# Patient Record
Sex: MALE | Race: WHITE | Employment: FULL TIME | URBAN - METROPOLITAN AREA
[De-identification: names, ages, dates, MRNs, and addresses within clinical notes are randomized per-mention and may not be internally consistent; named-entity substitution may affect disease eponyms.]

---

## 2017-02-14 PROBLEM — M25.551 PAIN OF RIGHT HIP JOINT: Status: ACTIVE | Noted: 2017-02-14

## 2017-02-14 PROBLEM — M53.3 SI (SACROILIAC) PAIN: Status: ACTIVE | Noted: 2017-02-14

## 2021-11-29 ENCOUNTER — LAB ENCOUNTER (OUTPATIENT)
Dept: LAB | Facility: HOSPITAL | Age: 51
End: 2021-11-29
Attending: STUDENT IN AN ORGANIZED HEALTH CARE EDUCATION/TRAINING PROGRAM
Payer: COMMERCIAL

## 2021-11-29 DIAGNOSIS — Z01.818 PRE-OP TESTING: ICD-10-CM

## 2021-12-02 PROBLEM — Z12.11 SPECIAL SCREENING FOR MALIGNANT NEOPLASMS, COLON: Status: ACTIVE | Noted: 2021-12-02

## 2022-07-19 ENCOUNTER — OFFICE VISIT (OUTPATIENT)
Dept: FAMILY MEDICINE CLINIC | Facility: CLINIC | Age: 52
End: 2022-07-19
Payer: COMMERCIAL

## 2022-07-19 VITALS
RESPIRATION RATE: 16 BRPM | WEIGHT: 145.19 LBS | HEART RATE: 64 BPM | BODY MASS INDEX: 19.24 KG/M2 | TEMPERATURE: 97 F | SYSTOLIC BLOOD PRESSURE: 114 MMHG | HEIGHT: 73 IN | DIASTOLIC BLOOD PRESSURE: 66 MMHG

## 2022-07-19 DIAGNOSIS — Z00.00 LABORATORY EXAMINATION ORDERED AS PART OF A COMPLETE PHYSICAL EXAMINATION: ICD-10-CM

## 2022-07-19 DIAGNOSIS — Z00.00 WELL ADULT EXAM: Primary | ICD-10-CM

## 2022-07-19 DIAGNOSIS — R07.9 LEFT-SIDED CHEST PAIN: ICD-10-CM

## 2022-07-19 PROCEDURE — 3008F BODY MASS INDEX DOCD: CPT | Performed by: PHYSICIAN ASSISTANT

## 2022-07-19 PROCEDURE — 3074F SYST BP LT 130 MM HG: CPT | Performed by: PHYSICIAN ASSISTANT

## 2022-07-19 PROCEDURE — 3078F DIAST BP <80 MM HG: CPT | Performed by: PHYSICIAN ASSISTANT

## 2022-07-19 PROCEDURE — 99386 PREV VISIT NEW AGE 40-64: CPT | Performed by: PHYSICIAN ASSISTANT

## 2022-07-19 RX ORDER — VALACYCLOVIR HYDROCHLORIDE 1 G/1
TABLET, FILM COATED ORAL 2 TIMES DAILY PRN
COMMUNITY
Start: 2022-06-14

## 2022-08-07 LAB
ABSOLUTE BASOPHILS: 21 CELLS/UL (ref 0–200)
ABSOLUTE EOSINOPHILS: 71 CELLS/UL (ref 15–500)
ABSOLUTE LYMPHOCYTES: 1361 CELLS/UL (ref 850–3900)
ABSOLUTE MONOCYTES: 655 CELLS/UL (ref 200–950)
ABSOLUTE NEUTROPHILS: 2092 CELLS/UL (ref 1500–7800)
ALBUMIN/GLOBULIN RATIO: 1.7 (CALC) (ref 1–2.5)
ALBUMIN: 4.3 G/DL (ref 3.6–5.1)
ALKALINE PHOSPHATASE: 63 U/L (ref 35–144)
ALT: 18 U/L (ref 9–46)
AST: 21 U/L (ref 10–35)
BASOPHILS: 0.5 %
BILIRUBIN, TOTAL: 0.7 MG/DL (ref 0.2–1.2)
BUN: 24 MG/DL (ref 7–25)
CALCIUM: 9.1 MG/DL (ref 8.6–10.3)
CARBON DIOXIDE: 30 MMOL/L (ref 20–32)
CHLORIDE: 103 MMOL/L (ref 98–110)
CHOL/HDLC RATIO: 2.7 (CALC)
CHOLESTEROL, TOTAL: 170 MG/DL
CREATININE: 0.98 MG/DL (ref 0.7–1.3)
EGFR: 94 ML/MIN/1.73M2
EOSINOPHILS: 1.7 %
GLOBULIN: 2.5 G/DL (CALC) (ref 1.9–3.7)
GLUCOSE: 83 MG/DL (ref 65–99)
HDL CHOLESTEROL: 62 MG/DL
HEMATOCRIT: 43.8 % (ref 38.5–50)
HEMOGLOBIN A1C: 5.4 % OF TOTAL HGB
HEMOGLOBIN: 14.3 G/DL (ref 13.2–17.1)
LDL-CHOLESTEROL: 94 MG/DL (CALC)
LYMPHOCYTES: 32.4 %
MCH: 30.6 PG (ref 27–33)
MCHC: 32.6 G/DL (ref 32–36)
MCV: 93.6 FL (ref 80–100)
MONOCYTES: 15.6 %
MPV: 9.8 FL (ref 7.5–12.5)
NEUTROPHILS: 49.8 %
NON-HDL CHOLESTEROL: 108 MG/DL (CALC)
PLATELET COUNT: 178 THOUSAND/UL (ref 140–400)
POTASSIUM: 4.3 MMOL/L (ref 3.5–5.3)
PROTEIN, TOTAL: 6.8 G/DL (ref 6.1–8.1)
PSA, TOTAL: 0.41 NG/ML
RDW: 12.5 % (ref 11–15)
RED BLOOD CELL COUNT: 4.68 MILLION/UL (ref 4.2–5.8)
SODIUM: 138 MMOL/L (ref 135–146)
TRIGLYCERIDES: 58 MG/DL
TSH W/REFLEX TO FT4: 3.57 MIU/L (ref 0.4–4.5)
WHITE BLOOD CELL COUNT: 4.2 THOUSAND/UL (ref 3.8–10.8)

## 2022-10-10 ENCOUNTER — HOSPITAL ENCOUNTER (OUTPATIENT)
Dept: CT IMAGING | Facility: HOSPITAL | Age: 52
End: 2022-10-10
Attending: FAMILY MEDICINE

## 2022-10-10 DIAGNOSIS — Z13.6 ENCOUNTER FOR SCREENING FOR CARDIOVASCULAR DISORDERS: ICD-10-CM

## 2022-10-11 ENCOUNTER — ORDER TRANSCRIPTION (OUTPATIENT)
Dept: ADMINISTRATIVE | Facility: HOSPITAL | Age: 52
End: 2022-10-11

## 2022-10-11 DIAGNOSIS — Z13.9 ENCOUNTER FOR SCREENING: Primary | ICD-10-CM

## 2022-10-11 NOTE — PROGRESS NOTES
Date of Service 10/10/2022    Katrin Ramírez  Date of Birth 8/11/1970    Patient Age: 47year old    PCP: Suzanne Grant MD  [address] [facility] [address] [address]    Consult Type  Type Scan/Screening: Heart Scan  Preliminary Heart Scan Score: 38.15                Body Mass Index  There is no height or weight on file to calculate BMI. Lipid Profile  Cholesterol: 170, done on 8/6/2022. HDL Cholesterol: 62, done on 8/6/2022. LDL Cholesterol: 94, done on 8/6/2022. TriGlycerides 58, done on 8/6/2022. Glucose 83, done on 8/6/2022, fasting  A1C: 5.4      Nurse Review  Risk factor information and results reviewed with Nurse: Yes, Family History, Stress    Recommended Follow Up:  Consult your physician regarding[de-identified] Final Heart Scan Report; Discuss potential for Incidental Finding    Free PV Screening offered to patient. To be scheduled for AAA and Carotids      Recommendations for Change:  Nutrition Changes: No Change Needed  Cholesterol Modification (goal of therapy depends upon your risk): No Change Needed  Exercise: No Change Needed, exercises regularly     Weight Management: Maintain Current Weight  Stress Management: Adopt Stress Management Techniques, work related stress, exercise as stress management  Repeat Heart Scan: 3 Years if Calcium Score is > 0. 0; Discuss with your Physician          Chris Recommended Resources:  Recommended Resources: Upcoming Classes, Medical Services and Health Library [email or web address]. Silvino Schwartz RN        Please Contact the Nurse Heart Line with any Questions or Concerns [phone].

## 2023-01-06 ENCOUNTER — TELEPHONE (OUTPATIENT)
Dept: FAMILY MEDICINE CLINIC | Facility: CLINIC | Age: 53
End: 2023-01-06

## 2023-01-06 DIAGNOSIS — Z13.228 SCREENING FOR METABOLIC DISORDER: ICD-10-CM

## 2023-01-06 DIAGNOSIS — Z12.5 SCREENING FOR PROSTATE CANCER: ICD-10-CM

## 2023-01-06 DIAGNOSIS — Z13.220 SCREENING FOR LIPID DISORDERS: ICD-10-CM

## 2023-01-06 DIAGNOSIS — Z13.29 SCREENING FOR THYROID DISORDER: ICD-10-CM

## 2023-01-06 DIAGNOSIS — Z13.0 SCREENING FOR BLOOD DISEASE: Primary | ICD-10-CM

## 2023-01-06 NOTE — TELEPHONE ENCOUNTER
Please enter lab orders for the patient's upcoming physical appointment. Physical scheduled: Your appointments     Date & Time Appointment Department [facility])    Feb 06, 2023  4:30 PM CST Physical - Established with Sarah Au  [facility], [address] [place] [address], [place]  (Chapito Jaeger)            Julianne Vasquez [facility] [address] [address] [phone]         Preferred lab: [facility]     The patient has been notified to complete fasting labs prior to their physical appointment.       (pt aware his last physical was done 7/19/2022. Pt stated his insurance usually covers per Calendar Year.  Pt to double check and if he needs to reschedule pt will call back)

## 2023-01-16 ENCOUNTER — TELEPHONE (OUTPATIENT)
Dept: FAMILY MEDICINE CLINIC | Facility: CLINIC | Age: 53
End: 2023-01-16

## 2023-01-16 DIAGNOSIS — M79.643 PAIN OF HAND, UNSPECIFIED LATERALITY: ICD-10-CM

## 2023-01-16 DIAGNOSIS — Z82.61 FAMILY HISTORY OF RHEUMATOID ARTHRITIS: Primary | ICD-10-CM

## 2023-01-16 NOTE — TELEPHONE ENCOUNTER
Need some symptoms to associated the labs with- numb fingers, discolored fingers, joint pains, swelling etc?

## 2023-01-16 NOTE — TELEPHONE ENCOUNTER
Pt is calling he said that he would like to know if a test could be added to his labs to see if he has Raynoads syndrome or RA. He is coming in and having his physical on 2/6/23.  Please call patient to advise

## 2023-01-16 NOTE — TELEPHONE ENCOUNTER
Patient reports fhx of RA (mother, niece, grandfather). He states he gets swelling to fingers at night. Wakes up to throbbing in fingers in the middle of the night. Swelling goes down during the day, but not entirely. The fingers that are swollen are more red than the others.      Routed to CAMRYN Cordova

## 2023-01-16 NOTE — TELEPHONE ENCOUNTER
Patient notified. He would like to move forward with requested labs.      Routed to Baby GenePastor  for ordering

## 2023-01-30 LAB
ABSOLUTE BASOPHILS: 30 CELLS/UL (ref 0–200)
ABSOLUTE EOSINOPHILS: 93 CELLS/UL (ref 15–500)
ABSOLUTE LYMPHOCYTES: 921 CELLS/UL (ref 850–3900)
ABSOLUTE MONOCYTES: 466 CELLS/UL (ref 200–950)
ABSOLUTE NEUTROPHILS: 2190 CELLS/UL (ref 1500–7800)
ALBUMIN/GLOBULIN RATIO: 1.8 (CALC) (ref 1–2.5)
ALBUMIN: 4.5 G/DL (ref 3.6–5.1)
ALKALINE PHOSPHATASE: 60 U/L (ref 35–144)
ALT: 26 U/L (ref 9–46)
ANA SCREEN, IFA: NEGATIVE
AST: 29 U/L (ref 10–35)
BASOPHILS: 0.8 %
BILIRUBIN, TOTAL: 0.7 MG/DL (ref 0.2–1.2)
BUN: 20 MG/DL (ref 7–25)
CALCIUM: 9.5 MG/DL (ref 8.6–10.3)
CARBON DIOXIDE: 33 MMOL/L (ref 20–32)
CHLORIDE: 102 MMOL/L (ref 98–110)
CHOL/HDLC RATIO: 2.9 (CALC)
CHOLESTEROL, TOTAL: 149 MG/DL
CREATININE: 0.97 MG/DL (ref 0.7–1.3)
EGFR: 95 ML/MIN/1.73M2
EOSINOPHILS: 2.5 %
GLOBULIN: 2.5 G/DL (CALC) (ref 1.9–3.7)
GLUCOSE: 79 MG/DL (ref 65–99)
HDL CHOLESTEROL: 52 MG/DL
HEMATOCRIT: 41.2 % (ref 38.5–50)
HEMOGLOBIN: 14.2 G/DL (ref 13.2–17.1)
LDL-CHOLESTEROL: 79 MG/DL (CALC)
LYMPHOCYTES: 24.9 %
MCH: 32.8 PG (ref 27–33)
MCHC: 34.5 G/DL (ref 32–36)
MCV: 95.2 FL (ref 80–100)
MONOCYTES: 12.6 %
MPV: 10.5 FL (ref 7.5–12.5)
NEUTROPHILS: 59.2 %
NON-HDL CHOLESTEROL: 97 MG/DL (CALC)
PLATELET COUNT: 174 THOUSAND/UL (ref 140–400)
POTASSIUM: 4.3 MMOL/L (ref 3.5–5.3)
PROTEIN, TOTAL: 7 G/DL (ref 6.1–8.1)
RDW: 13.2 % (ref 11–15)
RED BLOOD CELL COUNT: 4.33 MILLION/UL (ref 4.2–5.8)
RHEUMATOID FACTOR: <14 IU/ML
SODIUM: 140 MMOL/L (ref 135–146)
TOTAL PSA: 0.2 NG/ML
TRIGLYCERIDES: 92 MG/DL
TSH W/REFLEX TO FT4: 2.89 MIU/L (ref 0.4–4.5)
WHITE BLOOD CELL COUNT: 3.7 THOUSAND/UL (ref 3.8–10.8)

## 2023-02-06 ENCOUNTER — OFFICE VISIT (OUTPATIENT)
Dept: FAMILY MEDICINE CLINIC | Facility: CLINIC | Age: 53
End: 2023-02-06
Payer: COMMERCIAL

## 2023-02-06 VITALS
HEART RATE: 48 BPM | HEIGHT: 73 IN | BODY MASS INDEX: 19.06 KG/M2 | DIASTOLIC BLOOD PRESSURE: 76 MMHG | TEMPERATURE: 97 F | SYSTOLIC BLOOD PRESSURE: 106 MMHG | WEIGHT: 143.81 LBS | RESPIRATION RATE: 16 BRPM

## 2023-02-06 DIAGNOSIS — M79.89 SWELLING OF FINGER: ICD-10-CM

## 2023-02-06 DIAGNOSIS — Z00.00 ROUTINE PHYSICAL EXAMINATION: Primary | ICD-10-CM

## 2023-02-06 DIAGNOSIS — L98.9 LESION OF FINGER: ICD-10-CM

## 2023-02-06 PROCEDURE — 3078F DIAST BP <80 MM HG: CPT | Performed by: NURSE PRACTITIONER

## 2023-02-06 PROCEDURE — 3074F SYST BP LT 130 MM HG: CPT | Performed by: NURSE PRACTITIONER

## 2023-02-06 PROCEDURE — 99396 PREV VISIT EST AGE 40-64: CPT | Performed by: NURSE PRACTITIONER

## 2023-02-06 PROCEDURE — 3008F BODY MASS INDEX DOCD: CPT | Performed by: NURSE PRACTITIONER

## 2023-08-07 ENCOUNTER — HOSPITAL ENCOUNTER (OUTPATIENT)
Dept: ULTRASOUND IMAGING | Facility: HOSPITAL | Age: 53
End: 2023-08-07
Attending: FAMILY MEDICINE
Payer: COMMERCIAL

## 2023-08-07 DIAGNOSIS — Z13.9 ENCOUNTER FOR SCREENING: ICD-10-CM

## 2023-08-07 NOTE — PROGRESS NOTES
Date of Service 8/7/2023    Katarzyna Del Castillo  Date of Birth 8/11/1970    Patient Age: 47year old    PCP: Shanell Burger MD  [address] [facility] [address] [address]    Peripheral Vascular Screening  Left Carotid Artery - Normal  Right Carotid Artery - Normal    Abdominal Aorta Ultrasound - Normal       Previous Screening  Heart Scan Completed Previously: Yes  Year of last heart scan: 2022  Score of last heart scan: 38.15  Peripheral Vascular Scan Completed Previously: No          Risk Factors  Personal Risk Factors  Non-alterable Risk Factors: Personal History;Age;Gender;Family History      Blood Pressure    (Normal =< 120/80,  Elevated = 120-129/ >80,  High Stage1 130-139/80-89 , Stage2 >140/>90)    Lipid Profile  Cholesterol: 149, done on 1/28/2023. HDL Cholesterol: 52, done on 1/28/2023. LDL Cholesterol: 79, done on 1/28/2023. TriGlycerides 92, done on 1/28/2023. Cholesterol Goals  Value   Total  =< 200   HDL  = > 45 Men = > 55 Women   LDL   =< 100   Triglycerides  =< 150       Glucose and Hemoglobin A1C  Lab Results   Component Value Date    GLU 79 01/28/2023    A1C 5.4 08/06/2022     (Normal Fasting Glucose < 100mg/dl )    Nurse Review  Risk factor information and results reviewed with Nurse: Yes    Recommended Follow Up:  Consult your physician regarding[de-identified] Final Peripheral Vascular Stroke Screen Report; Discuss potential for Incidental Finding      Recommendations for Change:  Nutrition Changes: Increase Fiber      Smoking Cessation: No Change Needed      Repeat PV Screening: 3 Years    Resources: Look at upcoming classes on "[employer]. org    Chris Recommended Resources:               Jamilah Murphy RN        Please Contact the Nurse Heart Line with any Questions or Concerns [phone].

## 2023-10-23 ENCOUNTER — TELEPHONE (OUTPATIENT)
Dept: FAMILY MEDICINE CLINIC | Facility: CLINIC | Age: 53
End: 2023-10-23

## 2023-10-23 DIAGNOSIS — Z00.00 LABORATORY EXAM ORDERED AS PART OF ROUTINE GENERAL MEDICAL EXAMINATION: Primary | ICD-10-CM

## 2023-10-23 NOTE — TELEPHONE ENCOUNTER
Please enter lab orders for the patient's upcoming physical appointment. Physical scheduled: Your appointments       Date & Time Appointment Department [facility])    Jan 16, 2024  4:00 PM CST Adult Physical with Benny Flynn MD [address] [place] [address] ([address])    PLEASE NOTE - Most insurances allow a Complete Physical once every 366 days. Please schedule accordingly. Please arrive 15 minutes prior to your scheduled appointment. Please also bring your Insurance card, Photo ID, and your medication bottles or a list of your current medication. If you no longer require this appointment, please contact your physician office to cancel. [address] [place] [address] [facility] [phone]           Preferred lab: [facility]     The patient has been notified to complete fasting labs prior to their physical appointment.

## 2023-11-29 NOTE — H&P
HPI:     Adia Segura is a 47year old male  He presents as a consult with:  1. BPH/LUTS  2. Nocturia  3. OAB/UI    PCP - Ricardo    Reports 1 y h/o LUTS which has worsened since starting plant based diet. Not willing to go tack to original diet at this time as he had atherosclerosis noted on recent workup. It takes a while to get stream started at times. 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 Back/hip pain: none    AUA SS is 19/35 with 5 f; 4 n, u; 2 w; 1 s, I, ROBINSON. Mostly unhappy with LUTS. Incontinence: UI/dribbles a few times over the past couple mo. Not using pad  Penoscrotal: no abnormalities  YOVANI: ~ 30 g prostate, no nodules or tenderness. Beefy red rash around anus which he reports is itchy. UA is negative and PVR is zero    UTI hx: none  Gross hematuria: none  Tobacco hx: none  Kidney stone hx: none  Fam h/o  malignancy: none    100% potency    PSA 0.2 1/28/23    Drinks ~ 24 oz water, not much of anything else with light to medium yellow urine. For nocturia, I'd recommend the patient drink plenty of fluids first thing in the morning and avoid drinking anything at least 2-3 hours prior to bedtime. If the patient takes diuretic I would recommend they take them first thing in the morning. If the patient takes NSAIDs I would consider they switch to taking prior to bedtime. I'd also encourage regular physical activity (for at least 30 minutes at least three times per week) as this has been shown to help with nocturia. Finally we discussed that setting a regular bedtime can help regulate nocturnal levels of melatonin and ADH, which can help with sleep and reduce nocturia.     Discussed option to try flomax or alfuzosin for weak stream and he wants to try flomax. He will try to double water intake and avoid fluids prior to bedtime for frequency/nocturia. Trial flomax for weak stream. Consider CT and office cysto if symptoms do not improve to ensure no stricture or other abnormalities. If above strategies fail would consider PFT referral. Starting lotrisone for silvio-anal rash. Suggest he adjust diet if above strategies fail as new diet seemed to coincide with symptoms. If flomax works well can f/u in 1 y for check-up. HISTORY:  No past medical history on file. Past Surgical History:   Procedure Laterality Date    HIP SURGERY  9/2013    arthroscope      Family History   Problem Relation Age of Onset    Heart Disorder Father     Diabetes Father     Heart Attack Father 72    Stroke Father     Other (Lewy Body Dementia) Father     Heart Disorder Maternal Grandfather     Heart Attack Maternal Grandfather     Cancer Paternal Grandmother     Heart Disorder Paternal Grandfather     Heart Attack Paternal Grandfather 48      Social History:   Social History     Socioeconomic History    Marital status:    Tobacco Use    Smoking status: Never    Smokeless tobacco: Never   Vaping Use    Vaping Use: Never used   Substance and Sexual Activity    Alcohol use: Not Currently     Alcohol/week: 0.0 - 1.0 standard drinks of alcohol    Drug use: No   Other Topics Concern    Caffeine Concern No    Exercise Yes     Comment: daily- bikes,sit ups, push ups, stretching    Seat Belt Yes   Social History Narrative    Bicyclist        Medications (Active prior to today's visit):  Current Outpatient Medications   Medication Sig Dispense Refill    clotrimazole-betamethasone 1-0.05 % External Cream Apply 1 Application topically 2 (two) times daily. Apply to affected area for 10-14 days, then stop. Shower/clean area daily. If disorder recurs in the future, please restart medication 45 g 5    tamsulosin 0.4 MG Oral Cap Take 1 capsule (0.4 mg total) by mouth daily.  Take 1/2 hour following the same meal each day 30 capsule 11    valACYclovir 1 G Oral Tab 2 (two) times daily as needed. ibuprofen (MOTRIN) 200 MG Oral Tab Take 200 mg by mouth every 6 (six) hours as needed for Pain. Allergies:  No Known Allergies      ROS:     A comprehensive 10 point review of systems was completed. Pertinent positives and negatives noted in the the HPI. PHYSICAL EXAM:     GENERAL APPEARANCE: well, developed, well nourished, in no acute distress  NEUROLOGIC: nonfocal, alert and oriented  HEAD: normocephalic, atraumatic  EYES: sclera non-icteric  EARS: hearing intact  ORAL CAVITY: mucosa moist  NECK/THYROID: no obvious goiter or masses  LUNGS: nonlabored breathing  ABDOMEN: soft, no obvious masses or tenderness  SKIN: no obvious rashes    : as noted above    ASSESSMENT/PLAN:   Diagnoses and all orders for this visit:    BPH with obstruction/lower urinary tract symptoms  -     URINALYSIS, AUTO, W/O SCOPE  -     tamsulosin 0.4 MG Oral Cap; Take 1 capsule (0.4 mg total) by mouth daily. Take 1/2 hour following the same meal each day    Nocturia  -     URINALYSIS, AUTO, W/O SCOPE    Skin rash  -     clotrimazole-betamethasone 1-0.05 % External Cream; Apply 1 Application topically 2 (two) times daily. Apply to affected area for 10-14 days, then stop. Shower/clean area daily. If disorder recurs in the future, please restart medication      - as noted above. Thanks again for this consult.     Herbie Partida MD, [address]  Urologist  [facility]  Office: [phone]

## 2023-12-11 ENCOUNTER — OFFICE VISIT (OUTPATIENT)
Dept: SURGERY | Facility: CLINIC | Age: 53
End: 2023-12-11
Payer: COMMERCIAL

## 2023-12-11 DIAGNOSIS — N13.8 BPH WITH OBSTRUCTION/LOWER URINARY TRACT SYMPTOMS: Primary | ICD-10-CM

## 2023-12-11 DIAGNOSIS — R35.1 NOCTURIA: ICD-10-CM

## 2023-12-11 DIAGNOSIS — R21 SKIN RASH: ICD-10-CM

## 2023-12-11 DIAGNOSIS — N40.1 BPH WITH OBSTRUCTION/LOWER URINARY TRACT SYMPTOMS: Primary | ICD-10-CM

## 2023-12-11 LAB
APPEARANCE: CLEAR
BILIRUBIN: NEGATIVE
GLUCOSE (URINE DIPSTICK): NEGATIVE MG/DL
KETONES (URINE DIPSTICK): NEGATIVE MG/DL
LEUKOCYTES: NEGATIVE
MULTISTIX LOT#: NORMAL NUMERIC
NITRITE, URINE: NEGATIVE
OCCULT BLOOD: NEGATIVE
PH, URINE: 7.5 (ref 4.5–8)
PROTEIN (URINE DIPSTICK): NEGATIVE MG/DL
SPECIFIC GRAVITY: 1.01 (ref 1–1.03)
URINE-COLOR: YELLOW
UROBILINOGEN,SEMI-QN: 0.2 MG/DL (ref 0–1.9)

## 2023-12-11 RX ORDER — TAMSULOSIN HYDROCHLORIDE 0.4 MG/1
0.4 CAPSULE ORAL DAILY
Qty: 30 CAPSULE | Refills: 11 | Status: SHIPPED | OUTPATIENT
Start: 2023-12-11

## 2023-12-11 RX ORDER — CLOTRIMAZOLE AND BETAMETHASONE DIPROPIONATE 10; .64 MG/G; MG/G
1 CREAM TOPICAL 2 TIMES DAILY
Qty: 45 G | Refills: 5 | Status: SHIPPED | OUTPATIENT
Start: 2023-12-11

## 2023-12-11 NOTE — PATIENT INSTRUCTIONS
Double water intake, drink at least 40-60 oz water per day. Avoid fluids at least 2-3 h prior to bedtime  Try flomax. If symptoms do not improve would suggest checking CT of kidneys and cystoscopy for further evaluation. If that is unremarkable would consider pelvic floor therapy as next step. Ways to Reduce Nocturia (voiding frequently at night):    Try to drink plenty of water first thing in the morning. Avoid drinking anything at least 2-3 hours before bedtime. Unless you are on a fluid-restriction we typically recommend drinking 40-60 ounces water per day. Avoid/limit dietary irritants such as alcohol, juice, sugary things, citrus fruits, soda, carbonated beverages, coffee (including decaf), tea, spicy foods. Try to exercise at least 3 times per week for at least 30 minutes at a time. We recommend cardiovascular exercise such as jogging, elliptical, biking, speed-walking. Go to bed around the same time every night. This helps maintain normal nightly levels of ADH and melatonin - both of which are needed for a good night's sleep. Practice good sleep hygiene - Try to only use your bed for sleeping and sex. You should specifically try to avoid doing the following in bed: eating/drinking, reading, watching TV, or using your laptop/phone. If you take a diuretic, you may benefit from taking this in the morning rather than the evening. Talk to your PCP if you do take a diuretic at night to see if you can switch. If you take NSAIDs (such as motrin, aleve, ibuprofen, naproxen) you may benefit from taking this at night rather than the morning. These medications tell your kidneys not to work as hard for a few hours. I would NOT recommend taking extra doses of these medications just to sleep better at night. Fluid accumulation in the lower extremities that gets reabsorbed into the circulation at night is another cause for nocturnal polyuria.  If you accumulate fluid in your legs we recommend you try to ambulate/move around as much as safely possible and avoid prolonged sitting. If you are sitting we would also recommend you elevate your legs to prevent fluid from accumulating.

## 2024-01-03 ENCOUNTER — TELEPHONE (OUTPATIENT)
Dept: FAMILY MEDICINE CLINIC | Facility: CLINIC | Age: 54
End: 2024-01-03

## 2024-01-03 DIAGNOSIS — Z00.00 ROUTINE PHYSICAL EXAMINATION: Primary | ICD-10-CM

## 2024-01-03 NOTE — TELEPHONE ENCOUNTER
Pt requesting additional labs be ordered:  Vitamin B12  Vitamin D 3  Zinc  Iron    He is aware and verbalized understanding they may not be covered by insurance. He said \"ok\"

## 2024-01-07 LAB
% SATURATION: 40 % (CALC) (ref 20–48)
IRON BINDING CAPACITY: 309 MCG/DL (CALC) (ref 250–425)
IRON, TOTAL: 125 MCG/DL (ref 50–180)
VITAMIN B12: 459 PG/ML (ref 200–1100)
VITAMIN D, 25-OH, TOTAL: 32 NG/ML (ref 30–100)
ZINC: 69 MCG/DL (ref 60–130)

## 2024-01-15 LAB
ALBUMIN/GLOBULIN RATIO: 1.7 (CALC) (ref 1–2.5)
ALBUMIN: 4.2 G/DL (ref 3.6–5.1)
ALKALINE PHOSPHATASE: 58 U/L (ref 35–144)
ALT: 24 U/L (ref 9–46)
AST: 26 U/L (ref 10–35)
BILIRUBIN, TOTAL: 0.9 MG/DL (ref 0.2–1.2)
BUN: 14 MG/DL (ref 7–25)
CALCIUM: 9.3 MG/DL (ref 8.6–10.3)
CARBON DIOXIDE: 33 MMOL/L (ref 20–32)
CHLORIDE: 102 MMOL/L (ref 98–110)
CHOL/HDLC RATIO: 3 (CALC)
CHOLESTEROL, TOTAL: 146 MG/DL
CREATININE: 0.95 MG/DL (ref 0.7–1.3)
EGFR: 96 ML/MIN/1.73M2
GLOBULIN: 2.5 G/DL (CALC) (ref 1.9–3.7)
GLUCOSE: 85 MG/DL (ref 65–99)
HDL CHOLESTEROL: 49 MG/DL
HEMATOCRIT: 39.8 % (ref 38.5–50)
HEMOGLOBIN: 14.2 G/DL (ref 13.2–17.1)
LDL-CHOLESTEROL: 79 MG/DL (CALC)
MCH: 35 PG (ref 27–33)
MCHC: 35.7 G/DL (ref 32–36)
MCV: 98 FL (ref 80–100)
MPV: 11 FL (ref 7.5–12.5)
NON-HDL CHOLESTEROL: 97 MG/DL (CALC)
PLATELET COUNT: 180 THOUSAND/UL (ref 140–400)
POTASSIUM: 4.6 MMOL/L (ref 3.5–5.3)
PROTEIN, TOTAL: 6.7 G/DL (ref 6.1–8.1)
RDW: 12 % (ref 11–15)
RED BLOOD CELL COUNT: 4.06 MILLION/UL (ref 4.2–5.8)
SODIUM: 140 MMOL/L (ref 135–146)
TOTAL PSA: 0.2 NG/ML
TRIGLYCERIDES: 99 MG/DL
TSH W/REFLEX TO FT4: 2.49 MIU/L (ref 0.4–4.5)
WHITE BLOOD CELL COUNT: 3.2 THOUSAND/UL (ref 3.8–10.8)

## 2024-01-16 ENCOUNTER — OFFICE VISIT (OUTPATIENT)
Dept: FAMILY MEDICINE CLINIC | Facility: CLINIC | Age: 54
End: 2024-01-16
Payer: COMMERCIAL

## 2024-01-16 VITALS
OXYGEN SATURATION: 98 % | HEIGHT: 73 IN | DIASTOLIC BLOOD PRESSURE: 80 MMHG | SYSTOLIC BLOOD PRESSURE: 114 MMHG | WEIGHT: 151 LBS | BODY MASS INDEX: 20.01 KG/M2 | RESPIRATION RATE: 16 BRPM | HEART RATE: 54 BPM

## 2024-01-16 DIAGNOSIS — Z00.00 ANNUAL PHYSICAL EXAM: Primary | ICD-10-CM

## 2024-01-16 PROCEDURE — 3074F SYST BP LT 130 MM HG: CPT | Performed by: FAMILY MEDICINE

## 2024-01-16 PROCEDURE — 3079F DIAST BP 80-89 MM HG: CPT | Performed by: FAMILY MEDICINE

## 2024-01-16 PROCEDURE — 3008F BODY MASS INDEX DOCD: CPT | Performed by: FAMILY MEDICINE

## 2024-01-16 PROCEDURE — 99396 PREV VISIT EST AGE 40-64: CPT | Performed by: FAMILY MEDICINE

## 2024-01-16 NOTE — PROGRESS NOTES
Chief Complaint   Patient presents with    Physical     Patient here for physical      HPI:   Mark Anthony Sam is a 53 year old male who presents for a complete physical exam. He is a new patient to me, seen by Julian and Will previously.     Last colonoscopy:  12/2021.  Repeat 10 yrs.   Last PSA:  0.2  Immunizations: COVID UTD x 3.      Heart - CACS 38 in 10/2023.  Once he found this out, went vegan.  Has noticed improvement. No headaches any longer.  Chest pains when sleeping do not ocur any longer.  Does have heart disease in the family.      Wt Readings from Last 6 Encounters:   01/16/24 151 lb (68.5 kg)   02/06/23 143 lb 12.8 oz (65.2 kg)   07/19/22 145 lb 3.2 oz (65.9 kg)   03/10/22 150 lb (68 kg)   11/29/21 150 lb (68 kg)   08/18/15 150 lb (68 kg)     Body mass index is 19.92 kg/m².     Chemistry Labs:   Lab Results   Component Value Date/Time    GLU 85 01/13/2024 07:50 AM     01/13/2024 07:50 AM    K 4.6 01/13/2024 07:50 AM     01/13/2024 07:50 AM    CO2 33 (H) 01/13/2024 07:50 AM    CREATSERUM 0.95 01/13/2024 07:50 AM    CA 9.3 01/13/2024 07:50 AM    ALB 4.2 01/13/2024 07:50 AM    TP 6.7 01/13/2024 07:50 AM    ALKPHO 58 01/13/2024 07:50 AM    AST 26 01/13/2024 07:50 AM    ALT 24 01/13/2024 07:50 AM    BILT 0.9 01/13/2024 07:50 AM          Cholesterol  (most recent labs)   Lab Results   Component Value Date/Time    CHOLEST 146 01/13/2024 07:50 AM    HDL 49 01/13/2024 07:50 AM    LDL 79 01/13/2024 07:50 AM    TRIG 99 01/13/2024 07:50 AM      No results found for: \"PSA\"      Current Outpatient Medications   Medication Sig Dispense Refill    valACYclovir 1 G Oral Tab 2 (two) times daily as needed.      clotrimazole-betamethasone 1-0.05 % External Cream Apply 1 Application topically 2 (two) times daily. Apply to affected area for 10-14 days, then stop. Shower/clean area daily.  If disorder recurs in the future, please restart medication 45 g 5    tamsulosin 0.4 MG Oral Cap Take 1 capsule (0.4 mg  total) by mouth daily. Take 1/2 hour following the same meal each day 30 capsule 11    ibuprofen (MOTRIN) 200 MG Oral Tab Take 200 mg by mouth every 6 (six) hours as needed for Pain.        No past medical history on file.   Past Surgical History:   Procedure Laterality Date    HIP SURGERY  9/2013    arthroscope      Family History   Problem Relation Age of Onset    Heart Disorder Father     Diabetes Father     Heart Attack Father 65    Stroke Father     Other (Lewy Body Dementia) Father     Heart Disorder Maternal Grandfather     Heart Attack Maternal Grandfather     Cancer Paternal Grandmother     Heart Disorder Paternal Grandfather     Heart Attack Paternal Grandfather 50      Social History:  Social History     Socioeconomic History    Marital status:    Tobacco Use    Smoking status: Never    Smokeless tobacco: Never   Vaping Use    Vaping Use: Never used   Substance and Sexual Activity    Alcohol use: Not Currently     Alcohol/week: 0.0 - 1.0 standard drinks of alcohol    Drug use: No   Other Topics Concern    Caffeine Concern No    Exercise Yes     Comment: daily- bikes,sit ups, push ups, stretching    Seat Belt Yes   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   Exercise: biking 6 days a week.  Some strength training.   Diet: vegan.      REVIEW OF SYSTEMS:     All systems reviewed, negative other than noted above.    EXAM:   /80   Pulse 54   Resp 16   Ht 6' 1\" (1.854 m)   Wt 151 lb (68.5 kg)   SpO2 98%   BMI 19.92 kg/m²   Body mass index is 19.92 kg/m².     General appearance: alert, appears stated age and cooperative.  Thin male  Eyes: conjunctivae/corneas clear. PERRL, EOM's intact.   Ears: normal TM's and external ear canals both ears  Neck: no adenopathy, no JVD, supple, symmetrical, trachea midline and thyroid not enlarged, symmetric, no tenderness/mass/nodules  Lungs: clear to auscultation bilaterally  Heart: S1, S2  normal, no murmur, click, rub or gallop, regular rate and rhythm  Abdomen: soft, non-tender; bowel sounds normal; no masses,  no organomegaly  Extremities: extremities normal, atraumatic, no cyanosis or edema.  L index finger with redness over DIP.  Non tender.   Pulses: 2+ and symmetric  Neurologic: Alert and oriented X 3, normal strength and tone. Normal symmetric reflexes. Normal coordination and gait     ASSESSMENT AND PLAN:     Mark Anthony Pennara was seen in the office today:  had concerns including Physical (Patient here for physical).    1. Annual physical exam  Overall well  Healthy diet, exercise  Labs reassuring  Discussed vegan diet, protein and B12 supplementation  If heart scan repeat desired, typically wait 3-5 yrs.       Torsten Chambers M.D.   EM[facility] 3  01/16/24

## 2024-03-04 ENCOUNTER — HOSPITAL ENCOUNTER (OUTPATIENT)
Age: 54
Discharge: HOME | End: 2024-03-04
Payer: COMMERCIAL

## 2024-03-04 VITALS
HEIGHT: 73 IN | RESPIRATION RATE: 18 BRPM | BODY MASS INDEX: 19.22 KG/M2 | OXYGEN SATURATION: 100 % | DIASTOLIC BLOOD PRESSURE: 73 MMHG | SYSTOLIC BLOOD PRESSURE: 116 MMHG | WEIGHT: 145 LBS | HEART RATE: 57 BPM | TEMPERATURE: 98 F

## 2024-03-04 DIAGNOSIS — L03.011 PARONYCHIA OF FINGER OF RIGHT HAND: Primary | ICD-10-CM

## 2024-03-04 RX ORDER — AMOXICILLIN 500 MG/1
500 TABLET, FILM COATED ORAL EVERY 8 HOURS
COMMUNITY
Start: 2024-02-28

## 2024-03-04 RX ORDER — DOXYCYCLINE HYCLATE 100 MG/1
100 CAPSULE ORAL 2 TIMES DAILY
Qty: 10 CAPSULE | Refills: 0 | Status: SHIPPED | OUTPATIENT
Start: 2024-03-04 | End: 2024-03-09

## 2024-03-04 NOTE — ED PROVIDER NOTES
Patient Seen in: [facility]      History     Chief Complaint   Patient presents with    Derm Problem     Stated Complaint: right hand pain    Subjective:   HPI    52 YO male presents to immediate care for evaluation of skin infection at nail folds of right 4th finger starting several days ago. Denies fever/chills or any other systemic symptoms.         Objective:   History reviewed. No pertinent past medical history.           Past Surgical History:   Procedure Laterality Date    HIP SURGERY  9/2013    arthroscope                Social History     Socioeconomic History    Marital status:    Tobacco Use    Smoking status: Never    Smokeless tobacco: Never   Vaping Use    Vaping Use: Never used   Substance and Sexual Activity    Alcohol use: Not Currently     Alcohol/week: 0.0 - 1.0 standard drinks of alcohol    Drug use: No   Other Topics Concern    Caffeine Concern No    Exercise Yes     Comment: daily- bikes,sit ups, push ups, stretching    Seat Belt Yes   Social History Narrative    Bicyclist              Review of Systems    Positive for stated complaint: right hand pain  Other systems are as noted in HPI.  Constitutional and vital signs reviewed.      All other systems reviewed and negative except as noted above.    Physical Exam     ED Triage Vitals [03/04/24 1645]   /73   Pulse 57   Resp 18   Temp 97.6 °F (36.4 °C)   Temp src Temporal   SpO2 100 %   O2 Device None (Room air)       Current:/73   Pulse 57   Temp 97.6 °F (36.4 °C) (Temporal)   Resp 18   Ht 185.4 cm (6' 1\")   Wt 65.8 kg   SpO2 100%   BMI 19.13 kg/m²         Physical Exam  Vitals and nursing note reviewed.   Constitutional:       General: He is not in acute distress.     Appearance: Normal appearance. He is not ill-appearing, toxic-appearing or diaphoretic.   Cardiovascular:      Rate and Rhythm: Normal rate.   Pulmonary:      Effort: Pulmonary effort is normal. No respiratory distress.   Skin:      Findings: Erythema (erythema, tenderness, mild swelling at the proximal and lateral nail folds of the right 4th finger. Small subcutaneous purulent collection just proximal to the lateral nail fold) present.   Neurological:      Mental Status: He is alert and oriented to person, place, and time.   Psychiatric:         Mood and Affect: Mood normal.         Behavior: Behavior normal.           ED Course   Labs Reviewed - No data to display    MDM      This is a 52 YO male that presents to immediate care with skin infection at distal right 4th finger starting several days ago.     Differential diagnosis considered but not limited to paronychia without or with abscess, felon    Erythema, mild edema and tenderness at the proximal and lateral nail folds of the right ring finger consistent with paronychia. Small purulent collection proximal to the lateral nail fold. I&D of abscess with small poke incision using 27G, which expressed purulent discharge. Patient tolerated well.  No erythema, tenderness or swelling at the fingertip pulp space. Doxycyline prescribed. Home care and return instructions discussed with understanding.    Medical Decision Making  Risk  Prescription drug management.        Disposition and Plan     Clinical Impression:  1. Paronychia of finger of right hand         Disposition:  Discharge  3/4/2024  5:59 pm    Follow-up:  [facility] [address] [address]  [phone]    If symptoms worsen          Medications Prescribed:  Discharge Medication List as of 3/4/2024  6:01 PM        START taking these medications    Details   doxycycline 100 MG Oral Cap Take 1 capsule (100 mg total) by mouth 2 (two) times daily for 5 days., Normal, Disp-10 capsule, R-0

## 2024-12-23 ENCOUNTER — TELEPHONE (OUTPATIENT)
Dept: FAMILY MEDICINE CLINIC | Facility: CLINIC | Age: 54
End: 2024-12-23

## 2024-12-23 DIAGNOSIS — Z00.00 LABORATORY EXAM ORDERED AS PART OF ROUTINE GENERAL MEDICAL EXAMINATION: Primary | ICD-10-CM

## 2024-12-23 NOTE — TELEPHONE ENCOUNTER
Please enter lab orders for the patient's upcoming physical appointment.     Physical scheduled:   Your appointments       Date & Time Appointment Department ([place])    Jan 22, 2025 10:00 AM CST Adult Physical with Torsten Chambers MD [facility])    PLEASE NOTE - Most insurances allow a Complete Physical once every 366 days. Please schedule accordingly.    Please arrive 15 minutes prior to your scheduled appointment. Please also bring your Insurance card, Photo ID, and your medication bottles or a list of your current medication.    If you no longer require this appointment, please contact your physician office to cancel.              [facility] [address]  [address] [id number] [phone]           Preferred lab: [facility]     The patient has been notified to complete fasting labs prior to their physical appointment.

## 2025-01-20 LAB
ABSOLUTE BASOPHILS: 29 CELLS/UL (ref 0–200)
ABSOLUTE EOSINOPHILS: 59 CELLS/UL (ref 15–500)
ABSOLUTE LYMPHOCYTES: 848 CELLS/UL (ref 850–3900)
ABSOLUTE MONOCYTES: 580 CELLS/UL (ref 200–950)
ABSOLUTE NEUTROPHILS: 2684 CELLS/UL (ref 1500–7800)
ALBUMIN/GLOBULIN RATIO: 1.8 (CALC) (ref 1–2.5)
ALBUMIN: 4.3 G/DL (ref 3.6–5.1)
ALKALINE PHOSPHATASE: 60 U/L (ref 35–144)
ALT: 22 U/L (ref 9–46)
AST: 27 U/L (ref 10–35)
BASOPHILS: 0.7 %
BILIRUBIN, TOTAL: 0.6 MG/DL (ref 0.2–1.2)
BUN: 15 MG/DL (ref 7–25)
CALCIUM: 9.2 MG/DL (ref 8.6–10.3)
CARBON DIOXIDE: 34 MMOL/L (ref 20–32)
CHLORIDE: 100 MMOL/L (ref 98–110)
CHOL/HDLC RATIO: 2.4 (CALC)
CHOLESTEROL, TOTAL: 145 MG/DL
CREATININE: 1 MG/DL (ref 0.7–1.3)
EGFR: 90 ML/MIN/1.73M2
EOSINOPHILS: 1.4 %
GLOBULIN: 2.4 G/DL (CALC) (ref 1.9–3.7)
GLUCOSE: 85 MG/DL (ref 65–99)
HDL CHOLESTEROL: 60 MG/DL
HEMATOCRIT: 39.4 % (ref 38.5–50)
HEMOGLOBIN A1C: 5.2 % OF TOTAL HGB
HEMOGLOBIN: 13.5 G/DL (ref 13.2–17.1)
LDL-CHOLESTEROL: 67 MG/DL (CALC)
LYMPHOCYTES: 20.2 %
MCH: 34.4 PG (ref 27–33)
MCHC: 34.3 G/DL (ref 32–36)
MCV: 100.3 FL (ref 80–100)
MONOCYTES: 13.8 %
MPV: 10.4 FL (ref 7.5–12.5)
NEUTROPHILS: 63.9 %
NON-HDL CHOLESTEROL: 85 MG/DL (CALC)
PLATELET COUNT: 196 THOUSAND/UL (ref 140–400)
POTASSIUM: 5 MMOL/L (ref 3.5–5.3)
PROTEIN, TOTAL: 6.7 G/DL (ref 6.1–8.1)
RDW: 11.9 % (ref 11–15)
RED BLOOD CELL COUNT: 3.93 MILLION/UL (ref 4.2–5.8)
SODIUM: 139 MMOL/L (ref 135–146)
TOTAL PSA: 0.2 NG/ML
TRIGLYCERIDES: 93 MG/DL
TSH W/REFLEX TO FT4: 2.4 MIU/L (ref 0.4–4.5)
WHITE BLOOD CELL COUNT: 4.2 THOUSAND/UL (ref 3.8–10.8)

## 2025-01-22 ENCOUNTER — OFFICE VISIT (OUTPATIENT)
Dept: FAMILY MEDICINE CLINIC | Facility: CLINIC | Age: 55
End: 2025-01-22
Payer: COMMERCIAL

## 2025-01-22 VITALS
OXYGEN SATURATION: 99 % | DIASTOLIC BLOOD PRESSURE: 70 MMHG | HEART RATE: 52 BPM | RESPIRATION RATE: 16 BRPM | BODY MASS INDEX: 19.48 KG/M2 | WEIGHT: 147 LBS | SYSTOLIC BLOOD PRESSURE: 118 MMHG | HEIGHT: 73 IN

## 2025-01-22 DIAGNOSIS — B35.4 TINEA CORPORIS: ICD-10-CM

## 2025-01-22 DIAGNOSIS — Z00.00 ANNUAL PHYSICAL EXAM: Primary | ICD-10-CM

## 2025-01-22 PROBLEM — Z12.11 SPECIAL SCREENING FOR MALIGNANT NEOPLASMS, COLON: Status: RESOLVED | Noted: 2021-12-02 | Resolved: 2025-01-22

## 2025-01-22 RX ORDER — VALACYCLOVIR HYDROCHLORIDE 1 G/1
1000 TABLET, FILM COATED ORAL 2 TIMES DAILY PRN
Qty: 21 TABLET | Refills: 0 | Status: SHIPPED | OUTPATIENT
Start: 2025-01-22

## 2025-01-22 NOTE — PROGRESS NOTES
Chief Complaint   Patient presents with    Well Adult     Patient here for physical       HPI:   Mark Anthony Sam is a 54 year old male who presents for a complete physical exam.     Last colonoscopy:  12/2021.    Last PSA:  0.2  Immunizations: eligible for TDaP, shingles, PNA.    Wt Readings from Last 6 Encounters:   01/22/25 147 lb (66.7 kg)   03/04/24 145 lb (65.8 kg)   01/16/24 151 lb (68.5 kg)   02/06/23 143 lb 12.8 oz (65.2 kg)   07/19/22 145 lb 3.2 oz (65.9 kg)   03/10/22 150 lb (68 kg)     Body mass index is 19.39 kg/m².     Chemistry Labs:   Lab Results   Component Value Date/Time    GLU 85 01/17/2025 07:45 AM     01/17/2025 07:45 AM    K 5.0 01/17/2025 07:45 AM     01/17/2025 07:45 AM    CO2 34 (H) 01/17/2025 07:45 AM    CREATSERUM 1.00 01/17/2025 07:45 AM    CA 9.2 01/17/2025 07:45 AM    ALB 4.3 01/17/2025 07:45 AM    TP 6.7 01/17/2025 07:45 AM    ALKPHO 60 01/17/2025 07:45 AM    AST 27 01/17/2025 07:45 AM    ALT 22 01/17/2025 07:45 AM    BILT 0.6 01/17/2025 07:45 AM          Cholesterol  (most recent labs)   Lab Results   Component Value Date/Time    CHOLEST 145 01/17/2025 07:45 AM    HDL 60 01/17/2025 07:45 AM    LDL 67 01/17/2025 07:45 AM    TRIG 93 01/17/2025 07:45 AM      No results found for: \"PSA\"      Current Outpatient Medications   Medication Sig Dispense Refill    valACYclovir 1 G Oral Tab 2 (two) times daily as needed.        No past medical history on file.   Past Surgical History:   Procedure Laterality Date    Hip surgery  9/2013    arthroscope      Family History   Problem Relation Age of Onset    Hypertension Mother     Heart Disorder Father     Diabetes Father     Heart Attack Father 65    Stroke Father     Other (Lewy Body Dementia) Father     Heart Disease Brother         atherosclerosis    Heart Disorder Maternal Grandfather     Heart Attack Maternal Grandfather     Cancer Paternal Grandmother     Heart Disorder Paternal Grandfather     Heart Attack Paternal Grandfather 50       Social History:  Social History     Socioeconomic History    Marital status:    Tobacco Use    Smoking status: Never    Smokeless tobacco: Never   Vaping Use    Vaping status: Never Used   Substance and Sexual Activity    Alcohol use: Not Currently     Alcohol/week: 0.0 - 1.0 standard drinks of alcohol    Drug use: No   Other Topics Concern    Caffeine Concern No    Exercise Yes     Comment: daily- bikes,sit ups, push ups, stretching    Seat Belt Yes   Social History Narrative    Bicyclist     Social Drivers of Health     Food Insecurity: No Food Insecurity (1/22/2025)    NCSS - Food Insecurity     Worried About Running Out of Food in the Last Year: No     Ran Out of Food in the Last Year: No   Transportation Needs: No Transportation Needs (1/22/2025)    NCSS - Transportation     Lack of Transportation: No   Housing Stability: Not At Risk (1/22/2025)    NCSS - Housing/Utilities     Has Housing: Yes     Worried About Losing Housing: No     Unable to Get Utilities: No      Occ: .   : yes. Children: no.   Exercise: pretty much daily.  Biking, strength.   Diet: vegan for a few years. Whole grains, vegetables. Supplements B12, vit D, omega 3.      REVIEW OF SYSTEMS:     All systems reviewed, negative other than noted above.    EXAM:   /70   Pulse 52   Resp 16   Ht 6' 1\" (1.854 m)   Wt 147 lb (66.7 kg)   SpO2 99%   BMI 19.39 kg/m²   Body mass index is 19.39 kg/m².     General appearance: alert, appears stated age and cooperative  Eyes: conjunctivae/corneas clear. PERRL, EOM's intact.   Ears: normal TM's and external ear canals both ears  Neck: no adenopathy, no JVD, supple, symmetrical, trachea midline and thyroid not enlarged, symmetric, no tenderness/mass/nodules  Lungs: clear to auscultation bilaterally  Heart: S1, S2 normal, no murmur, click, rub or gallop, regular rate and rhythm  Abdomen: soft, non-tender; bowel sounds normal; no masses,  no organomegaly  Extremities:  extremities normal, atraumatic, no cyanosis or edema  Pulses: 2+ and symmetric  Neurologic: Alert and oriented X 3, normal strength and tone. Normal symmetric reflexes. Normal coordination and gait   Skin - patch of tinea R buttocks.     ASSESSMENT AND PLAN:     Mark Anthony Sam was seen in the office today:  had concerns including Well Adult (Patient here for physical ).    1. Annual physical exam  Overall well  Very healthy diet, regular exercise  I suspect this is what is causing the weight to remain low.  If if hoping to get this up, need to try to add some healthy calories, change exercise from cardio to more weight strength training  Colon screening up-to-date  Labs reassuring otherwise    2. Tinea corporis  On buttocks.  Not better with creams and lotions  Need to treat this as antifungal.  Start terbinafine cream  Consider oral pills if this persists          Torsten Chambers M.D.   [facility] 3  01/22/25        Note to patient: The 21 Century Cures Act makes medical notes like these available to patients in the interest of transparency. However, be advised this is a medical document. It is intended as peer to peer communication. It is written in medical language and may contain abbreviations or verbiage that are unfamiliar. It may appear blunt or direct. Medical documents are intended to carry relevant information, facts as evident, and the clinical opinion of the practitioner.

## (undated) NOTE — LETTER
Date & Time: 3/4/2024, 6:05 PM  Patient: Mark Anthony Sam  Encounter Provider(s):    Doris Barriga PA-C       To Whom It May Concern:    Mark Anthony Sam was seen and treated in our department on 3/4/2024. He .    If you have any questions or concerns, please do not hesitate to call.        _____________________________  Physician/APC Signature